# Patient Record
Sex: MALE | Race: WHITE | NOT HISPANIC OR LATINO | URBAN - METROPOLITAN AREA
[De-identification: names, ages, dates, MRNs, and addresses within clinical notes are randomized per-mention and may not be internally consistent; named-entity substitution may affect disease eponyms.]

---

## 2021-07-24 ENCOUNTER — EMERGENCY (EMERGENCY)
Facility: HOSPITAL | Age: 31
LOS: 0 days | Discharge: HOME | End: 2021-07-24
Attending: EMERGENCY MEDICINE | Admitting: EMERGENCY MEDICINE
Payer: COMMERCIAL

## 2021-07-24 VITALS
RESPIRATION RATE: 18 BRPM | TEMPERATURE: 99 F | DIASTOLIC BLOOD PRESSURE: 90 MMHG | HEART RATE: 78 BPM | SYSTOLIC BLOOD PRESSURE: 124 MMHG | OXYGEN SATURATION: 99 % | WEIGHT: 123.02 LBS

## 2021-07-24 DIAGNOSIS — R06.02 SHORTNESS OF BREATH: ICD-10-CM

## 2021-07-24 DIAGNOSIS — R07.2 PRECORDIAL PAIN: ICD-10-CM

## 2021-07-24 DIAGNOSIS — R07.89 OTHER CHEST PAIN: ICD-10-CM

## 2021-07-24 DIAGNOSIS — R05 COUGH: ICD-10-CM

## 2021-07-24 DIAGNOSIS — Z88.1 ALLERGY STATUS TO OTHER ANTIBIOTIC AGENTS STATUS: ICD-10-CM

## 2021-07-24 PROCEDURE — 93010 ELECTROCARDIOGRAM REPORT: CPT

## 2021-07-24 PROCEDURE — 71046 X-RAY EXAM CHEST 2 VIEWS: CPT | Mod: 26

## 2021-07-24 PROCEDURE — 99284 EMERGENCY DEPT VISIT MOD MDM: CPT

## 2021-07-24 NOTE — ED PROVIDER NOTE - CLINICAL SUMMARY MEDICAL DECISION MAKING FREE TEXT BOX
Patient presented with chest pain x several days associated with mild dyspnea. Otherwise afebrile, Hd stable, well appearing. EKG obtained and non-ischemic without evidence of STEMI. Chest xray negative for pneumothorax, pneumonia, widened mediastinum, evidence of rib fractures, enlarged cardiac silhouette or any other emergent pathologies. Patient remained without recurrence of chest pain or abnormalities during monitoring in ED. Ambulatory without difficulty or desaturation, tolerates PO. HEART Score 0, PERC 0/no PE risk factors, no concern clinically for dissection. Given the above, will discharge home with outpatient follow up. Patient agreeable with plan. Agrees to return to ED for any new or worsening symptoms.

## 2021-07-24 NOTE — ED PROVIDER NOTE - OBJECTIVE STATEMENT
Patient is a 30 year old male, no past medical history presenting with chest pain and shortness of breath x 1 week. States he has been coughing as well. Pain is substernal, non-radiating, worse with coughing, relieved with rest, mild severity. Denies other complaints, no fevers.

## 2021-07-24 NOTE — ED PROVIDER NOTE - CARE PROVIDER_API CALL
Frandy Jin  Gastroenterology  [address] [place] [address]  Phone: [phone]  Fax: [phone]  Follow Up Time:     aGrth Horton (MD)  Cardiovascular Disease; Internal Medicine; Interventional Cardiology  [address] [place] [address]  Phone: [phone]  Fax: [phone]  Follow Up Time:

## 2021-07-24 NOTE — ED PROVIDER NOTE - NSFOLLOWUPINSTRUCTIONS_ED_ALL_ED_FT
CHEST PAIN - Discharge Care           Chest Pain    WHAT YOU NEED TO KNOW:    Chest pain can be caused by a range of conditions, from not serious to life-threatening. Chest pain can be a symptom of a digestive problem, such as acid reflux or a stomach ulcer. An anxiety attack or a strong emotion, such as anger, can also cause chest pain. Infection, inflammation, or a fracture in the bones or cartilage in your chest can cause pain or discomfort. Sometimes chest pain or pressure is caused by poor blood flow to your heart (angina). Chest pain may also be caused by life-threatening conditions such as a heart attack or blood clot in your lungs.    DISCHARGE INSTRUCTIONS:    Call your local emergency number (911 in the ) or have someone call if:   •You have any of the following signs of a heart attack: ?Squeezing, pressure, or pain in your chest      ?You may also have any of the following: ?Discomfort or pain in your back, neck, jaw, stomach, or arm      ?Shortness of breath      ?Nausea or vomiting      ?Lightheadedness or a sudden cold sweat            Seek care immediately if:   •You have chest discomfort that gets worse, even with medicine.      •You cough or vomit blood.      •Your bowel movements are black or bloody.      •You cannot stop vomiting, or it hurts to swallow.      Call your doctor if:   •You have questions or concerns about your condition or care.          Medicines:   •Medicines may be given to treat the cause of your chest pain. Examples include pain medicine, anxiety medicine, or medicines to increase blood flow to your heart.      •Do not take certain medicines without asking your healthcare provider first. These include NSAIDs, herbal or vitamin supplements, or hormones (estrogen or progestin).      •Take your medicine as directed. Contact your healthcare provider if you think your medicine is not helping or if you have side effects. Tell him or her if you are allergic to any medicine. Keep a list of the medicines, vitamins, and herbs you take. Include the amounts, and when and why you take them. Bring the list or the pill bottles to follow-up visits. Carry your medicine list with you in case of an emergency.      Healthy living tips: The following are general healthy guidelines. If the cause of your chest pain is known, your healthcare provider will give you specific guidelines to follow.  •Do not smoke. Nicotine and other chemicals in cigarettes and cigars can cause lung and heart damage. Ask your healthcare provider for information if you currently smoke and need help to quit. E-cigarettes or smokeless tobacco still contain nicotine. Talk to your healthcare provider before you use these products.      •Choose a variety of healthy foods as often as possible. Include fresh, frozen, or canned fruits and vegetables. Also include low-fat dairy products, fish, chicken (without skin), and lean meats. Your healthcare provider or a dietitian can help you create meal plans. You may need to avoid certain foods or drinks if your pain is caused by a digestion problem.  Healthy Foods           •Lower your sodium (salt) intake. Limit foods that are high in sodium, such as canned foods, salty snacks, and cold cuts. If you add salt when you cook food, do not add more at the table. Choose low-sodium canned foods as much as possible.             •Drink plenty of water every day. Water helps your body to control temperature and blood pressure. Ask your healthcare provider how much water you should drink every day.      •Ask about activity. Your healthcare provider will tell you which activities to limit or avoid. Ask when you can drive, return to work, and have sex. Ask about the best exercise plan for you.      •Maintain a healthy weight. Ask your healthcare provider what a healthy weight is for you. Ask him or her to help you create a safe weight loss plan if you are overweight.      •Ask about vaccines you may need. Get the influenza (flu) vaccine every year as soon as recommended, usually in September or October. You may also need a pneumococcal vaccine to prevent pneumonia. The vaccine is usually given every 5 years, starting at age 65. Your healthcare provider can tell you if should get other vaccines, and when to get them.      Follow up with your healthcare provider within 72 hours, or as directed: You may need to return for more tests to find the cause of your chest pain. You may be referred to a specialist, such as a cardiologist or gastroenterologist. Write down your questions so you remember to ask them during your visits.

## 2021-07-24 NOTE — ED PROVIDER NOTE - PATIENT PORTAL LINK FT
You can access the FollowMyHealth Patient Portal offered by [facility] by registering at the following website: [email or web address]. By joining [facility]’s FollowMyHealth portal, you will also be able to view your health information using other applications (apps) compatible with our system.

## 2021-07-24 NOTE — ED PROVIDER NOTE - CARE PROVIDERS DIRECT ADDRESSES
[email or web address]

## 2021-07-24 NOTE — ED PROVIDER NOTE - ATTENDING CONTRIBUTION TO CARE
30 year old male, no pmhx presenting with several days of chest pain and dyspnea. Denies having this before. Otherwise denies fevers, N/V/D, blood in stool, urinary symptoms or leg swelling.    Vital Signs: I have reviewed the initial vital signs.  Constitutional: NAD, well-nourished, appears stated age, no acute distress.  HEENT: Airway patent, moist MM, no erythema/swelling/deformity of oral structures. EOMI, PERRLA.  CV: regular rate, regular rhythm, well-perfused extremities, 2+ b/l DP and radial pulses equal.  Lungs: BCTA, no increased WOB.  ABD: NTND, no guarding or rebound, no pulsatile mass, no hernias.   MSK: Neck supple, nontender, nl ROM, no stepoff. Chest nontender. Back nontender in TLS spine or to b/l bony structures or flanks. Ext nontender, nl rom, no deformity.   INTEG: Skin warm, dry, no rash.  NEURO: A&Ox3, normal strength, nl sensation throughout, normal speech.   PSYCH: Calm, cooperative, normal affect and interaction.    Lungs clear, PERC 0, ambulatory without desaturation in ED. Will obtain EKG, CXR, re-eval.

## 2022-07-26 PROBLEM — Z00.00 ENCOUNTER FOR PREVENTIVE HEALTH EXAMINATION: Status: ACTIVE | Noted: 2022-07-26

## 2023-10-09 ENCOUNTER — NON-APPOINTMENT (OUTPATIENT)
Age: 33
End: 2023-10-09

## 2025-05-30 ENCOUNTER — EMERGENCY (EMERGENCY)
Facility: HOSPITAL | Age: 35
LOS: 0 days | Discharge: HOME | End: 2025-05-30
Attending: STUDENT IN AN ORGANIZED HEALTH CARE EDUCATION/TRAINING PROGRAM
Payer: MEDICAID

## 2025-05-30 VITALS
RESPIRATION RATE: 19 BRPM | WEIGHT: 119.93 LBS | OXYGEN SATURATION: 100 % | HEART RATE: 84 BPM | DIASTOLIC BLOOD PRESSURE: 78 MMHG | SYSTOLIC BLOOD PRESSURE: 144 MMHG | HEIGHT: 67 IN | TEMPERATURE: 98 F

## 2025-05-30 VITALS
RESPIRATION RATE: 18 BRPM | OXYGEN SATURATION: 99 % | HEART RATE: 71 BPM | TEMPERATURE: 99 F | SYSTOLIC BLOOD PRESSURE: 124 MMHG | DIASTOLIC BLOOD PRESSURE: 81 MMHG

## 2025-05-30 DIAGNOSIS — K56.1 INTUSSUSCEPTION: ICD-10-CM

## 2025-05-30 DIAGNOSIS — Z88.1 ALLERGY STATUS TO OTHER ANTIBIOTIC AGENTS: ICD-10-CM

## 2025-05-30 DIAGNOSIS — R10.812 LEFT UPPER QUADRANT ABDOMINAL TENDERNESS: ICD-10-CM

## 2025-05-30 PROBLEM — Z78.9 OTHER SPECIFIED HEALTH STATUS: Chronic | Status: ACTIVE | Noted: 2021-07-27

## 2025-05-30 LAB
ALBUMIN SERPL ELPH-MCNC: 4.8 G/DL — SIGNIFICANT CHANGE UP (ref 3.5–5.2)
ALP SERPL-CCNC: 59 U/L — SIGNIFICANT CHANGE UP (ref 30–115)
ALT FLD-CCNC: 9 U/L — SIGNIFICANT CHANGE UP (ref 0–41)
ANION GAP SERPL CALC-SCNC: 12 MMOL/L — SIGNIFICANT CHANGE UP (ref 7–14)
AST SERPL-CCNC: 15 U/L — SIGNIFICANT CHANGE UP (ref 0–41)
BASOPHILS # BLD AUTO: 0.05 K/UL — SIGNIFICANT CHANGE UP (ref 0–0.2)
BASOPHILS NFR BLD AUTO: 0.9 % — SIGNIFICANT CHANGE UP (ref 0–1)
BILIRUB SERPL-MCNC: 0.9 MG/DL — SIGNIFICANT CHANGE UP (ref 0.2–1.2)
BUN SERPL-MCNC: 14 MG/DL — SIGNIFICANT CHANGE UP (ref 10–20)
CALCIUM SERPL-MCNC: 9.4 MG/DL — SIGNIFICANT CHANGE UP (ref 8.4–10.5)
CHLORIDE SERPL-SCNC: 106 MMOL/L — SIGNIFICANT CHANGE UP (ref 98–110)
CO2 SERPL-SCNC: 23 MMOL/L — SIGNIFICANT CHANGE UP (ref 17–32)
CREAT SERPL-MCNC: 1.1 MG/DL — SIGNIFICANT CHANGE UP (ref 0.7–1.5)
EGFR: 90 ML/MIN/1.73M2 — SIGNIFICANT CHANGE UP
EGFR: 90 ML/MIN/1.73M2 — SIGNIFICANT CHANGE UP
EOSINOPHIL # BLD AUTO: 0.11 K/UL — SIGNIFICANT CHANGE UP (ref 0–0.7)
EOSINOPHIL NFR BLD AUTO: 1.9 % — SIGNIFICANT CHANGE UP (ref 0–8)
GLUCOSE SERPL-MCNC: 94 MG/DL — SIGNIFICANT CHANGE UP (ref 70–99)
HCT VFR BLD CALC: 44.7 % — SIGNIFICANT CHANGE UP (ref 42–52)
HGB BLD-MCNC: 15 G/DL — SIGNIFICANT CHANGE UP (ref 14–18)
IMM GRANULOCYTES NFR BLD AUTO: 0.3 % — SIGNIFICANT CHANGE UP (ref 0.1–0.3)
LACTATE SERPL-SCNC: 1.1 MMOL/L — SIGNIFICANT CHANGE UP (ref 0.7–2)
LIDOCAIN IGE QN: 18 U/L — SIGNIFICANT CHANGE UP (ref 7–60)
LYMPHOCYTES # BLD AUTO: 1.76 K/UL — SIGNIFICANT CHANGE UP (ref 1.2–3.4)
LYMPHOCYTES # BLD AUTO: 30.1 % — SIGNIFICANT CHANGE UP (ref 20.5–51.1)
MCHC RBC-ENTMCNC: 29.9 PG — SIGNIFICANT CHANGE UP (ref 27–31)
MCHC RBC-ENTMCNC: 33.6 G/DL — SIGNIFICANT CHANGE UP (ref 32–37)
MCV RBC AUTO: 89 FL — SIGNIFICANT CHANGE UP (ref 80–94)
MONOCYTES # BLD AUTO: 0.34 K/UL — SIGNIFICANT CHANGE UP (ref 0.1–0.6)
MONOCYTES NFR BLD AUTO: 5.8 % — SIGNIFICANT CHANGE UP (ref 1.7–9.3)
NEUTROPHILS # BLD AUTO: 3.56 K/UL — SIGNIFICANT CHANGE UP (ref 1.4–6.5)
NEUTROPHILS NFR BLD AUTO: 61 % — SIGNIFICANT CHANGE UP (ref 42.2–75.2)
NRBC BLD AUTO-RTO: 0 /100 WBCS — SIGNIFICANT CHANGE UP (ref 0–0)
PLATELET # BLD AUTO: 248 K/UL — SIGNIFICANT CHANGE UP (ref 130–400)
PMV BLD: 10 FL — SIGNIFICANT CHANGE UP (ref 7.4–10.4)
POTASSIUM SERPL-MCNC: 4.5 MMOL/L — SIGNIFICANT CHANGE UP (ref 3.5–5)
POTASSIUM SERPL-SCNC: 4.5 MMOL/L — SIGNIFICANT CHANGE UP (ref 3.5–5)
PROT SERPL-MCNC: 7.4 G/DL — SIGNIFICANT CHANGE UP (ref 6–8)
RBC # BLD: 5.02 M/UL — SIGNIFICANT CHANGE UP (ref 4.7–6.1)
RBC # FLD: 11.9 % — SIGNIFICANT CHANGE UP (ref 11.5–14.5)
SODIUM SERPL-SCNC: 141 MMOL/L — SIGNIFICANT CHANGE UP (ref 135–146)
WBC # BLD: 5.84 K/UL — SIGNIFICANT CHANGE UP (ref 4.8–10.8)
WBC # FLD AUTO: 5.84 K/UL — SIGNIFICANT CHANGE UP (ref 4.8–10.8)

## 2025-05-30 PROCEDURE — 99285 EMERGENCY DEPT VISIT HI MDM: CPT

## 2025-05-30 PROCEDURE — 99285 EMERGENCY DEPT VISIT HI MDM: CPT | Mod: 25

## 2025-05-30 PROCEDURE — 96374 THER/PROPH/DIAG INJ IV PUSH: CPT | Mod: XU

## 2025-05-30 PROCEDURE — 36415 COLL VENOUS BLD VENIPUNCTURE: CPT

## 2025-05-30 PROCEDURE — 74177 CT ABD & PELVIS W/CONTRAST: CPT

## 2025-05-30 PROCEDURE — 71046 X-RAY EXAM CHEST 2 VIEWS: CPT | Mod: 26

## 2025-05-30 PROCEDURE — 93010 ELECTROCARDIOGRAM REPORT: CPT

## 2025-05-30 PROCEDURE — 83690 ASSAY OF LIPASE: CPT

## 2025-05-30 PROCEDURE — 85025 COMPLETE CBC W/AUTO DIFF WBC: CPT

## 2025-05-30 PROCEDURE — 93005 ELECTROCARDIOGRAM TRACING: CPT

## 2025-05-30 PROCEDURE — 74177 CT ABD & PELVIS W/CONTRAST: CPT | Mod: 26

## 2025-05-30 PROCEDURE — 71046 X-RAY EXAM CHEST 2 VIEWS: CPT

## 2025-05-30 PROCEDURE — 83605 ASSAY OF LACTIC ACID: CPT

## 2025-05-30 PROCEDURE — 80053 COMPREHEN METABOLIC PANEL: CPT

## 2025-05-30 RX ORDER — KETOROLAC TROMETHAMINE 30 MG/ML
15 INJECTION, SOLUTION INTRAMUSCULAR; INTRAVENOUS ONCE
Refills: 0 | Status: DISCONTINUED | OUTPATIENT
Start: 2025-05-30 | End: 2025-05-30

## 2025-05-30 RX ADMIN — Medication 1000 MILLILITER(S): at 15:13

## 2025-05-30 RX ADMIN — KETOROLAC TROMETHAMINE 15 MILLIGRAM(S): 30 INJECTION, SOLUTION INTRAMUSCULAR; INTRAVENOUS at 15:13

## 2025-05-30 NOTE — CONSULT NOTE ADULT - ASSESSMENT
ASSESSMENT:  34yM w/o PMHx who presented with LUQ abdominal pain radiating to left ribs and left back for 1 week. Surgery consulted for CT finding of small bowel-small bowel intussusception of left upper quadrant. At bedside examination, abdomen is soft, nondistended, nontender.      PLAN:  - No acute surgical intervention   - Recommend PO trial   - If tolerates PO trial, patient may follow up outpatient with Dr. Pike in 1-2 weeks   - If discharged from the ED, please recommend patient return back to the ED if worsening abdominal pain, unable to tolerate PO diet, or persistent constipation   - If unable to tolerate PO trial, surgery team will reevaluate       Above plan discussed with Attending Surgeon Dr. Pike, patient, patient family, and ED team  05-30-25 @ 19:15

## 2025-05-30 NOTE — ED PROVIDER NOTE - NSFOLLOWUPINSTRUCTIONS_ED_ALL_ED_FT
Follow-up with general surgery at the surgery clinic    Intussusception,  Intestines in a child's body, with a close-up of intussusception in the small intestine.   An intussusception is a condition in which a section of intestine folds into or slides inside the next section of intestine. This is similar to the way a telescope folds when you close it. The intestines are the part of the digestive system that absorb food and liquids after they pass through the stomach. Most digestion takes place in the upper part of the intestines (small intestine). Water is absorbed and stool (feces) is formed in the lower part of the intestines (large intestine). Most intussusceptions happen in the area where the small intestine connects to the large intestine (ileocecal junction). This condition is most common in children.    Intussusception causes a blockage in the intestines. It also puts pressure on the part of the intestine that has folded in. This part can become swollen, irritated, and bloody. The increased pressure can also cut off the blood supply to that part of the intestine. If this happens, a hole (perforation) in the wall of the intestine may develop. Blood and fluids from the intestines may leak into the belly, causing irritation (peritonitis). Peritonitis is a medical emergency that needs to be treated right away.    What are the causes?  In most cases, the cause of this condition is not known. In some cases, the cause may be an abnormal growth in the intestine.    What increases the risk?  Children are more likely to develop this condition if they:  Are male.  Are younger than 3 years of age. Intussusception is uncommon in infants younger than 3 months and in children 6 years and older.  Recently had a viral infection.  Have an abnormal growth in the intestine, such as a:  Polyp.  Cyst.  Tumor.  Poorly formed blood vessel (malformation).  Had a recent surgery in the intestines.  Have had an intussusception in the past.  Recently received the rotavirus vaccine. This is a rare side effect of the vaccine.  What are the signs or symptoms?  Symptoms of this condition include:  Sudden and severe pain in the abdomen. At first, the pain may last for 15–20 minutes, go away, and then come back. Over time, the pain gets worse and lasts longer.  Crying.  Refusing to eat or drink.  The child pulling his or her knees up to the chest.  Other signs and symptoms may include:  Vomiting.  Swelling and hardening of the belly area.  Low-grade fever.  Weakness.  Pale skin.  Dehydration.  Bloody stools tinged with mucus (currant jelly stools). This is a late sign of the condition.  How is this diagnosed?  This condition may be diagnosed based on:  Your child's symptoms.  Your child's medical history.  A physical exam. Your child's health care provider may feel the child's abdomen for a hard, sausage-shaped lump.  Imaging tests to confirm the diagnosis. These may include ultrasound and X-ray of the abdomen.  How is this treated?  This condition is treated in the hospital. The goal of treatment is to correct the intussusception before peritonitis develops. Treatment may include:  Giving fluids and medicine through an IV.  Placing a tube into your child's stomach through his or her nose (nasogastric tube) to remove stomach fluids.  If there is no perforation or peritonitis:  Your child may be given an enema. This passes air or fluid into the intestine. The pressure of the air or fluid can:  Clear the intussusception.  Help the health care provider clearly see where the problem is.  Your child will have an ultrasound to make sure air and fluids in the intestines are flowing normally.  Your child may need surgery if:  Enema treatment has not worked to clear the intussusception.  There is any sign of perforation or peritonitis.  Areas of dead or perforated intestinal tissue need to be removed.  The condition returns after enema treatment.  Follow these instructions at home:  Medicines    Give over-the-counter and prescription medicines only as told by your child's health care provider.  Do not give your child aspirin because of the association with Reye's syndrome.  If your child was prescribed an antibiotic medicine, give it as told by the child's health care provider. Do not stop giving the antibiotic even if your child starts to feel better.  General instructions    Follow all instructions from your child's health care provider.  Follow the health care provider's directions about your child's activity level. Ask the health care provider what activities are safe for your child.  Watch for any signs and symptoms of intussusception returning.  Keep all follow-up visits. This is important.  Get help right away if:  Your child develops signs or symptoms of intussusception at home. These include:  Crying excessively, refusing to eat or drink, or pulling his or her knees up to the chest.  Repeated vomiting.  Bloody stools tinged with mucus (currant jelly stools). This is a late sign of the condition.  Swelling and hardening of the belly.  Low-grade fever.  Weakness.  Pale skin.  Dehydration.  Your child who is younger than 3 months has a temperature of 100.4°F (38°C) or higher.  Your child who is 3 months to 3 years old has a temperature of 102.2°F (39°C) or higher.  Summary  Intussusception is a folding of the intestine that causes a blockage in the intestines.  In most cases, the cause of this condition is not known. Risk factors include being male, being 3 months to 3 years old, or having had a recent viral infection.  The goal of treatment is to remove the blockage. Sometimes surgery is needed. A medical emergency can result if this is not treated.  Watch for any signs and symptoms of intussusception returning.  This information is not intended to replace advice given to you by your health care provider. Make sure you discuss any questions you have with your health care provider.

## 2025-05-30 NOTE — ED PROVIDER NOTE - OBJECTIVE STATEMENT
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  Patient denies any trauma or increasing activity.  Patient states he has been intermittently short of breath due to the pain but denies any shortness of breath at this time.  Denies any chest pain.  No fevers, nausea, vomiting, diarrhea, constipation,  symptoms, rashes.  Patient has not take anything for his pain today.  Patient denies any recent travel, no hormonal use, no recent surgeries.  Patient is PERC negative.

## 2025-05-30 NOTE — ED PROVIDER NOTE - PHYSICAL EXAMINATION
Constitutional: Well developed, well nourished, no acute distress  Head: Normocephalic, Atraumatic  Eyes: PERRLA, EOMI, conjunctiva and sclera WNL  ENT: Moist mucous membranes, no rhinorrhea,    Neck: Supple, Nontender,    Respiratory: Normal chest excursion with respiration; Breath sounds clear and equal B/L; No wheezes, rales, or rhonchi   Cardiovascular: RRR; Normal S1, S2; No murmurs, rubs or gallops   ABD/GI:   Nondistended; Left upper quadrant abdominal tenderness, No guarding, rigidity or rebound; No CVA tenderness  EXT/MS: Moving all extremities; Distal pulses 2+ B/L; No peripheral edema  Skin: Normal for age and race; Warm and dry; No rash  Neurologic: AAO x 4;  Normal motor and sensory function  Psychiatric: Appropriate affect, normal mood

## 2025-05-30 NOTE — ED PROVIDER NOTE - CLINICAL SUMMARY MEDICAL DECISION MAKING FREE TEXT BOX
Pt with small bowel intussesception, abdominal pain for 3 days, no nausea/vomitting fever chills    tolerating PO, abdomen soft    DW surgery    Appropriate medications for patient's presenting complaints were ordered and effects were reassessed. Patient's external records were reviewed    Escalation to admission and/or observation was considered.  Patient feels much better and is comfortable with discharge.  Appropriate follow-up was arranged.

## 2025-05-30 NOTE — ED ADULT NURSE NOTE - OBJECTIVE STATEMENT
Pt with  C/O c left side flank pain  radiated to right  flank stated with chest pain sensation B/L abdomen and back pain , Pt denies fever cholls no N/V/D or constipation ,

## 2025-05-30 NOTE — ED PROVIDER NOTE - PATIENT PORTAL LINK FT
You can access the FollowMyHealth Patient Portal offered by [facility] by registering at the following website: [email or web address]. By joining [facility]’s FollowMyHealth portal, you will also be able to view your health information using other applications (apps) compatible with our system.

## 2025-05-30 NOTE — CONSULT NOTE ADULT - SUBJECTIVE AND OBJECTIVE BOX
GENERAL SURGERY CONSULT NOTE    Patient: JESUS CASTILLO , 34y ([birth year])Male   MRN: [id number]  Location: [facility] ED  Visit: 05-30-25 Emergency  Date: 05-30-25 @ 19:15    HPI: 34 year old male without significant PMH presents with LUQ/Left ribs pain for 1 week. Patient reports left sided ribs pain / left upper quadrant pain radiates to his back and worsens when he takes  a deep breath or when sitting up. Patient denies any trauma to chest/abdomen, denies any surgical history, no changes in PO intake or bowel movements (last BM was today). Patient denies fevers, chills, nausea, vomiting, or urinary symptoms. Patient never had an endoscopy in the past. Surgery consulted for CT finding of small bowel-small bowel intussusception of left upper quadrant. At bedside examination, abdomen is soft, nondistended, nontender.        PAST MEDICAL & SURGICAL HISTORY:  No pertinent past medical history      No significant past surgical history          Home Medications:        VITALS:  T(F): 98.6 (05-30-25 @ 18:32), Max: 98.6 (05-30-25 @ 18:32)  HR: 71 (05-30-25 @ 18:32) (71 - 84)  BP: 124/81 (05-30-25 @ 18:32) (124/81 - 144/78)  RR: 18 (05-30-25 @ 18:32) (18 - 19)  SpO2: 99% (05-30-25 @ 18:32) (99% - 100%)    PHYSICAL EXAM:  General: NAD, AAOx3, calm and cooperative  HEENT: NCAT, NATHANIEL, EOMI,   Cardiac: RRR   Respiratory: Normal respiratory effort,  Abdomen: Soft, non-distended, non-tender, no rebound, no guarding.   Musculoskeletal: Strength 5/5 BL UE/LE, ROM intact, compartments soft  Vascular: Pulses 2+ throughout, extremities well perfused  Skin: Warm/dry, normal color, no jaundice      MEDICATIONS  (STANDING):    MEDICATIONS  (PRN):      LAB/STUDIES:                        15.0   5.84  )-----------( 248      ( 30 May 2025 14:52 )             44.7     05-30    141  |  106  |  14  ----------------------------<  94  4.5   |  23  |  1.1    Ca    9.4      30 May 2025 14:52    TPro  7.4  /  Alb  4.8  /  TBili  0.9  /  DBili  x   /  AST  15  /  ALT  9   /  AlkPhos  59  05-30      LIVER FUNCTIONS - ( 30 May 2025 14:52 )  Alb: 4.8 g/dL / Pro: 7.4 g/dL / ALK PHOS: 59 U/L / ALT: 9 U/L / AST: 15 U/L / GGT: x           Urinalysis Basic - ( 30 May 2025 14:52 )    Color: x / Appearance: x / SG: x / pH: x  Gluc: 94 mg/dL / Ketone: x  / Bili: x / Urobili: x   Blood: x / Protein: x / Nitrite: x   Leuk Esterase: x / RBC: x / WBC x   Sq Epi: x / Non Sq Epi: x / Bacteria: x                  IMAGING:  < from: CT Abdomen and Pelvis w/ IV Cont (05.30.25 @ 16:49) >  Small bowel-small bowel intussusception in the left upper quadrant.   Findings can be transient.  Recommend surgery consultation.

## 2025-05-30 NOTE — ED ADULT TRIAGE NOTE - CHIEF COMPLAINT QUOTE
"I have trouble breathing for about a week now and I get this sensation in my chest as if I'm being poked. My left rib hurts." No cough, no fever/chills. Denies trauma to chest/rib. EKG done.

## 2025-05-30 NOTE — ED PROVIDER NOTE - NSFOLLOWUPCLINICS_GEN_ALL_ED_FT
[facility]  Surgery  [address] [place] [address]  Phone: [phone]  Fax:

## 2025-05-30 NOTE — CONSULT NOTE ADULT - ATTENDING COMMENTS
Trauma/Acute Care Surgery Attending Note Attestation  Patient was seen and examined bedside.  I reviewed the resident/PA note and agreed with above assessment and plan with following additions and corrections.    History as above. At the time of my evaluation, patient's pain had improved. He reports the pain is intermittent and not constant over the past week.    T(C): 37 (05-30-25 @ 18:32), Max: 37 (05-30-25 @ 18:32)  HR: 71 (05-30-25 @ 18:32) (71 - 84)  BP: 124/81 (05-30-25 @ 18:32) (124/81 - 144/78)  RR: 18 (05-30-25 @ 18:32) (18 - 19)  SpO2: 99% (05-30-25 @ 18:32) (99% - 100%)    I independently performed a medically appropriate exam. The exam was notable for soft, not tender, not distended abdomen.                          15.0   5.84  )-----------( 248      ( 30 May 2025 14:52 )             44.7     05-30    141  |  106  |  14  ----------------------------<  94  4.5   |  23  |  1.1    Ca 9.4; Mg x ; Phos x       ( 30 May 2025 14:52 )  Alb: 4.8 g/dL / Pro: 7.4 g/dL / AlkPhos: 59 U/L / ALT: 9 U/L / AST: 15 U/L / GGT:x     / Tbili 0.9 mg/dL/ Dbili x     / Indbili x          Lactate, Blood: 1.1 mmol/L (05-30 @ 14:59)    CT A/P reviewed and interpreted by me: short segment small bowel intussusception in LUQ    Assessment/Plan:  34y Male who presents with LUQ abdominal pain with short segment small bowel intussusception in LUQ. Discussed with patient that since he has no obstructive symptoms and his pain has resolved, can d/c home after PO trial versus admission for overnight observation. Also counseled on concerning symptoms that should prompt return to ED. If pain is persistent, he can follow up as outpatient and be evaluated for elective laparoscopy to evaluate the segment of bowel. There is a low possibility that there is a small bowel tumor causing a lead point for intussusception but completely outside the realm of possibility. Patient agreeable to PO trial and discharge with return precautions. If patient's pain increases with PO intake, please call back.    Fuad Pike MD  Trauma/Acute Care Surgery/Surgical Critical Care Attending Trauma/Acute Care Surgery Attending Note Attestation  Patient was seen and examined bedside.  I reviewed the resident/PA note and agreed with above assessment and plan with following additions and corrections.    History as above. At the time of my evaluation, patient's pain had improved. He reports the pain is intermittent and not constant over the past week.    T(C): 37 (05-30-25 @ 18:32), Max: 37 (05-30-25 @ 18:32)  HR: 71 (05-30-25 @ 18:32) (71 - 84)  BP: 124/81 (05-30-25 @ 18:32) (124/81 - 144/78)  RR: 18 (05-30-25 @ 18:32) (18 - 19)  SpO2: 99% (05-30-25 @ 18:32) (99% - 100%)    I independently performed a medically appropriate exam. The exam was notable for soft, not tender, not distended abdomen.                          15.0   5.84  )-----------( 248      ( 30 May 2025 14:52 )             44.7     05-30    141  |  106  |  14  ----------------------------<  94  4.5   |  23  |  1.1    Ca 9.4; Mg x ; Phos x       ( 30 May 2025 14:52 )  Alb: 4.8 g/dL / Pro: 7.4 g/dL / AlkPhos: 59 U/L / ALT: 9 U/L / AST: 15 U/L / GGT:x     / Tbili 0.9 mg/dL/ Dbili x     / Indbili x          Lactate, Blood: 1.1 mmol/L (05-30 @ 14:59)    CT A/P reviewed and interpreted by me: short segment small bowel intussusception in LUQ    Assessment/Plan:  34y Male who presents with LUQ abdominal pain with short segment small bowel intussusception in LUQ. No concerning symptoms or physical exam findings. No acute surgical intervention. Discussed with patient that since he has no obstructive symptoms and his pain has resolved, can d/c home after PO trial versus admission for overnight observation. Also counseled on concerning symptoms that should prompt return to ED. If pain is persistent, he can follow up as outpatient and be evaluated for elective laparoscopy to evaluate the segment of bowel. There is a low possibility that there is a small bowel tumor causing a lead point for intussusception but completely outside the realm of possibility. Patient agreeable to PO trial and discharge with return precautions. If patient's pain increases with PO intake, please call back.    Fuad Pike MD  Trauma/Acute Care Surgery/Surgical Critical Care Attending